# Patient Record
Sex: MALE | Race: WHITE | NOT HISPANIC OR LATINO
[De-identification: names, ages, dates, MRNs, and addresses within clinical notes are randomized per-mention and may not be internally consistent; named-entity substitution may affect disease eponyms.]

---

## 2018-05-17 ENCOUNTER — APPOINTMENT (OUTPATIENT)
Dept: PSYCHIATRY | Facility: CLINIC | Age: 63
End: 2018-05-17
Payer: COMMERCIAL

## 2018-05-17 DIAGNOSIS — Z87.39 PERSONAL HISTORY OF OTHER DISEASES OF THE MUSCULOSKELETAL SYSTEM AND CONNECTIVE TISSUE: ICD-10-CM

## 2018-05-17 PROBLEM — Z00.00 ENCOUNTER FOR PREVENTIVE HEALTH EXAMINATION: Status: ACTIVE | Noted: 2018-05-17

## 2018-05-17 PROCEDURE — 99205 OFFICE O/P NEW HI 60 MIN: CPT

## 2018-06-28 ENCOUNTER — APPOINTMENT (OUTPATIENT)
Dept: PSYCHIATRY | Facility: CLINIC | Age: 63
End: 2018-06-28

## 2018-08-21 ENCOUNTER — APPOINTMENT (OUTPATIENT)
Dept: PSYCHIATRY | Facility: CLINIC | Age: 63
End: 2018-08-21
Payer: COMMERCIAL

## 2018-08-21 PROCEDURE — 99214 OFFICE O/P EST MOD 30 MIN: CPT

## 2018-11-12 ENCOUNTER — APPOINTMENT (OUTPATIENT)
Dept: PSYCHIATRY | Facility: CLINIC | Age: 63
End: 2018-11-12
Payer: COMMERCIAL

## 2018-11-12 PROCEDURE — 99214 OFFICE O/P EST MOD 30 MIN: CPT

## 2018-11-12 RX ORDER — CLONAZEPAM 0.25 MG/1
0.25 TABLET, ORALLY DISINTEGRATING ORAL
Qty: 90 | Refills: 0 | Status: DISCONTINUED | COMMUNITY
Start: 2018-08-21 | End: 2018-11-12

## 2019-01-14 ENCOUNTER — APPOINTMENT (OUTPATIENT)
Dept: PSYCHIATRY | Facility: CLINIC | Age: 64
End: 2019-01-14

## 2019-02-20 ENCOUNTER — APPOINTMENT (OUTPATIENT)
Dept: PSYCHIATRY | Facility: CLINIC | Age: 64
End: 2019-02-20
Payer: COMMERCIAL

## 2019-02-20 PROCEDURE — 99214 OFFICE O/P EST MOD 30 MIN: CPT

## 2019-03-18 ENCOUNTER — APPOINTMENT (OUTPATIENT)
Dept: PSYCHIATRY | Facility: CLINIC | Age: 64
End: 2019-03-18

## 2019-05-09 ENCOUNTER — OUTPATIENT (OUTPATIENT)
Dept: OUTPATIENT SERVICES | Facility: HOSPITAL | Age: 64
LOS: 1 days | Discharge: STILL ADMITTED OR ADMINISTRATIVE | End: 2019-05-09

## 2019-05-15 ENCOUNTER — APPOINTMENT (OUTPATIENT)
Dept: PSYCHIATRY | Facility: CLINIC | Age: 64
End: 2019-05-15
Payer: COMMERCIAL

## 2019-05-15 PROCEDURE — 99214 OFFICE O/P EST MOD 30 MIN: CPT

## 2019-05-20 ENCOUNTER — APPOINTMENT (OUTPATIENT)
Dept: PSYCHIATRY | Facility: CLINIC | Age: 64
End: 2019-05-20
Payer: COMMERCIAL

## 2019-05-20 PROCEDURE — 99214 OFFICE O/P EST MOD 30 MIN: CPT

## 2019-06-12 ENCOUNTER — APPOINTMENT (OUTPATIENT)
Dept: PSYCHIATRY | Facility: CLINIC | Age: 64
End: 2019-06-12
Payer: COMMERCIAL

## 2019-06-12 PROCEDURE — 99214 OFFICE O/P EST MOD 30 MIN: CPT

## 2019-06-12 RX ORDER — TRAZODONE HYDROCHLORIDE 50 MG/1
50 TABLET ORAL
Qty: 60 | Refills: 0 | Status: DISCONTINUED | COMMUNITY
Start: 2019-05-15 | End: 2019-06-12

## 2019-08-29 ENCOUNTER — APPOINTMENT (OUTPATIENT)
Dept: PSYCHIATRY | Facility: CLINIC | Age: 64
End: 2019-08-29
Payer: COMMERCIAL

## 2019-08-29 PROCEDURE — 99214 OFFICE O/P EST MOD 30 MIN: CPT

## 2019-09-30 ENCOUNTER — APPOINTMENT (OUTPATIENT)
Dept: PSYCHIATRY | Facility: CLINIC | Age: 64
End: 2019-09-30

## 2019-10-04 ENCOUNTER — APPOINTMENT (OUTPATIENT)
Dept: PSYCHIATRY | Facility: CLINIC | Age: 64
End: 2019-10-04

## 2019-11-26 ENCOUNTER — APPOINTMENT (OUTPATIENT)
Dept: PSYCHIATRY | Facility: CLINIC | Age: 64
End: 2019-11-26
Payer: COMMERCIAL

## 2019-11-26 PROCEDURE — 99214 OFFICE O/P EST MOD 30 MIN: CPT

## 2020-02-18 ENCOUNTER — APPOINTMENT (OUTPATIENT)
Dept: PSYCHIATRY | Facility: CLINIC | Age: 65
End: 2020-02-18

## 2020-02-24 ENCOUNTER — APPOINTMENT (OUTPATIENT)
Dept: PULMONOLOGY | Facility: CLINIC | Age: 65
End: 2020-02-24

## 2020-03-04 ENCOUNTER — APPOINTMENT (OUTPATIENT)
Dept: PSYCHIATRY | Facility: CLINIC | Age: 65
End: 2020-03-04
Payer: COMMERCIAL

## 2020-03-04 PROCEDURE — 99214 OFFICE O/P EST MOD 30 MIN: CPT

## 2020-06-08 ENCOUNTER — APPOINTMENT (OUTPATIENT)
Dept: PSYCHIATRY | Facility: CLINIC | Age: 65
End: 2020-06-08
Payer: COMMERCIAL

## 2020-06-08 PROCEDURE — 99214 OFFICE O/P EST MOD 30 MIN: CPT

## 2020-06-08 RX ORDER — ESCITALOPRAM OXALATE 5 MG/1
5 TABLET ORAL DAILY
Qty: 30 | Refills: 2 | Status: DISCONTINUED | COMMUNITY
Start: 2019-06-12 | End: 2020-06-08

## 2020-06-08 RX ORDER — NEFAZODONE HYDROCHLORIDE 150 MG/1
150 TABLET ORAL TWICE DAILY
Qty: 60 | Refills: 1 | Status: DISCONTINUED | COMMUNITY
Start: 2018-05-17 | End: 2020-06-08

## 2020-09-14 ENCOUNTER — APPOINTMENT (OUTPATIENT)
Dept: PSYCHIATRY | Facility: CLINIC | Age: 65
End: 2020-09-14
Payer: COMMERCIAL

## 2020-09-14 PROCEDURE — 99214 OFFICE O/P EST MOD 30 MIN: CPT

## 2020-09-17 ENCOUNTER — APPOINTMENT (OUTPATIENT)
Dept: PULMONOLOGY | Facility: CLINIC | Age: 65
End: 2020-09-17

## 2020-09-18 ENCOUNTER — APPOINTMENT (OUTPATIENT)
Dept: PULMONOLOGY | Facility: CLINIC | Age: 65
End: 2020-09-18
Payer: COMMERCIAL

## 2020-09-18 VITALS
RESPIRATION RATE: 18 BRPM | OXYGEN SATURATION: 98 % | TEMPERATURE: 97.7 F | SYSTOLIC BLOOD PRESSURE: 128 MMHG | HEART RATE: 78 BPM | DIASTOLIC BLOOD PRESSURE: 80 MMHG

## 2020-09-18 DIAGNOSIS — R06.02 SHORTNESS OF BREATH: ICD-10-CM

## 2020-09-18 DIAGNOSIS — Z87.891 PERSONAL HISTORY OF NICOTINE DEPENDENCE: ICD-10-CM

## 2020-09-18 PROCEDURE — 36415 COLL VENOUS BLD VENIPUNCTURE: CPT

## 2020-09-18 PROCEDURE — 99204 OFFICE O/P NEW MOD 45 MIN: CPT | Mod: 25

## 2020-09-19 NOTE — DISCUSSION/SUMMARY
[FreeTextEntry1] : He is a 65 year old former smoker with a history of anxiety, sleep apnea and COPD. He smoked three packs per day for 47 years (141 pack/years). \par \par To continue with Symbicort and albuterol as needed for now.  \par \par A PFT will be obtained after a nasopharyngeal swab for COVID-19-PCR has been obtained and the result is negative. \par \par Blood work obtained. \par \par LDCT of the lungs advised. \par \par Will get PSG data. \par \par Further recommendations to follow the results of the above.

## 2020-09-19 NOTE — HISTORY OF PRESENT ILLNESS
[Former] : former [>= 30 pack years] : >= 30 pack years [Never] : never [TextBox_4] : He is a 65 year-old man. Presented with shortness of breath. The dyspnea is aggravated by warm conditions. Also by exertion. No chest pain, pressure or palpitations. The dyspnea is relieved by albuterol.\par \par He has a history of sleep apnea. He has been on CPAP for several years.\par \par He stopped smoking 2 years ago. He is retired from construction. [YearQuit] : 2018

## 2020-09-19 NOTE — PHYSICAL EXAM
[No Acute Distress] : no acute distress [IV] : Mallampati Class: IV [No Neck Mass] : no neck mass [Normal S1, S2] : normal s1, s2 [Clear to Auscultation Bilaterally] : clear to auscultation bilaterally [No HSM] : no hsm [No Clubbing] : no clubbing [No Edema] : no edema [No Focal Deficits] : no focal deficits [Oriented x3] : oriented x3

## 2020-09-19 NOTE — REVIEW OF SYSTEMS
[Wheezing] : wheezing [SOB on Exertion] : sob on exertion [Arthralgias] : arthralgias [Trauma/ Injury] : trauma/ injury [Anxiety] : anxiety [Fever] : no fever [Nasal Congestion] : no nasal congestion [Cough] : no cough [Chest Discomfort] : no chest discomfort [Edema] : no edema [Hay Fever] : no hay fever [GERD] : no gerd [Rash] : no rash [Anemia] : no anemia [Seizures] : no seizures [Diabetes] : no diabetes [Thyroid Problem] : no thyroid problem

## 2020-09-21 ENCOUNTER — APPOINTMENT (OUTPATIENT)
Dept: PSYCHIATRY | Facility: CLINIC | Age: 65
End: 2020-09-21
Payer: COMMERCIAL

## 2020-09-21 LAB
25(OH)D3 SERPL-MCNC: 20.7 NG/ML
ALBUMIN SERPL ELPH-MCNC: 4.3 G/DL
ALP BLD-CCNC: 66 U/L
ALT SERPL-CCNC: 26 U/L
ANION GAP SERPL CALC-SCNC: 12 MMOL/L
AST SERPL-CCNC: 16 U/L
BASOPHILS # BLD AUTO: 0.07 K/UL
BASOPHILS NFR BLD AUTO: 0.7 %
BILIRUB SERPL-MCNC: 0.3 MG/DL
BUN SERPL-MCNC: 9 MG/DL
CALCIUM SERPL-MCNC: 9.6 MG/DL
CHLORIDE SERPL-SCNC: 100 MMOL/L
CHOLEST SERPL-MCNC: 211 MG/DL
CHOLEST/HDLC SERPL: 4.8 RATIO
CO2 SERPL-SCNC: 25 MMOL/L
CREAT SERPL-MCNC: 0.83 MG/DL
EOSINOPHIL # BLD AUTO: 0.1 K/UL
EOSINOPHIL NFR BLD AUTO: 1 %
GLUCOSE SERPL-MCNC: 179 MG/DL
HCT VFR BLD CALC: 44 %
HDLC SERPL-MCNC: 44 MG/DL
HGB BLD-MCNC: 14.1 G/DL
IMM GRANULOCYTES NFR BLD AUTO: 0.7 %
LDLC SERPL CALC-MCNC: 131 MG/DL
LYMPHOCYTES # BLD AUTO: 2.33 K/UL
LYMPHOCYTES NFR BLD AUTO: 23.1 %
MAN DIFF?: NORMAL
MCHC RBC-ENTMCNC: 30 PG
MCHC RBC-ENTMCNC: 32 GM/DL
MCV RBC AUTO: 93.6 FL
MONOCYTES # BLD AUTO: 0.62 K/UL
MONOCYTES NFR BLD AUTO: 6.1 %
NEUTROPHILS # BLD AUTO: 6.91 K/UL
NEUTROPHILS NFR BLD AUTO: 68.4 %
PLATELET # BLD AUTO: 215 K/UL
POTASSIUM SERPL-SCNC: 4.2 MMOL/L
PROT SERPL-MCNC: 6.9 G/DL
RBC # BLD: 4.7 M/UL
RBC # FLD: 13.2 %
SODIUM SERPL-SCNC: 137 MMOL/L
TRIGL SERPL-MCNC: 178 MG/DL
TSH SERPL-ACNC: 1.33 UIU/ML
VIT B12 SERPL-MCNC: 563 PG/ML
WBC # FLD AUTO: 10.1 K/UL

## 2020-09-21 PROCEDURE — 99214 OFFICE O/P EST MOD 30 MIN: CPT

## 2020-12-18 ENCOUNTER — APPOINTMENT (OUTPATIENT)
Dept: PSYCHIATRY | Facility: CLINIC | Age: 65
End: 2020-12-18
Payer: COMMERCIAL

## 2020-12-18 PROCEDURE — 99072 ADDL SUPL MATRL&STAF TM PHE: CPT

## 2020-12-18 PROCEDURE — 99214 OFFICE O/P EST MOD 30 MIN: CPT

## 2020-12-18 RX ORDER — NEFAZODONE HYDROCHLORIDE 250 MG/1
250 TABLET ORAL
Qty: 180 | Refills: 1 | Status: DISCONTINUED | COMMUNITY
Start: 2020-03-27 | End: 2020-12-18

## 2021-01-06 ENCOUNTER — APPOINTMENT (OUTPATIENT)
Dept: PSYCHIATRY | Facility: CLINIC | Age: 66
End: 2021-01-06
Payer: COMMERCIAL

## 2021-01-06 PROCEDURE — 99214 OFFICE O/P EST MOD 30 MIN: CPT

## 2021-01-06 PROCEDURE — 99072 ADDL SUPL MATRL&STAF TM PHE: CPT

## 2021-01-06 RX ORDER — DULOXETINE HYDROCHLORIDE 30 MG/1
30 CAPSULE, DELAYED RELEASE PELLETS ORAL
Qty: 60 | Refills: 0 | Status: DISCONTINUED | COMMUNITY
Start: 2020-12-18 | End: 2021-01-06

## 2021-01-15 RX ORDER — BUPROPION HYDROCHLORIDE 150 MG/1
150 TABLET, EXTENDED RELEASE ORAL DAILY
Qty: 30 | Refills: 0 | Status: DISCONTINUED | COMMUNITY
Start: 2021-01-06 | End: 2021-01-15

## 2021-03-19 ENCOUNTER — APPOINTMENT (OUTPATIENT)
Dept: PSYCHIATRY | Facility: CLINIC | Age: 66
End: 2021-03-19
Payer: COMMERCIAL

## 2021-03-19 PROCEDURE — 99072 ADDL SUPL MATRL&STAF TM PHE: CPT

## 2021-03-19 PROCEDURE — 99214 OFFICE O/P EST MOD 30 MIN: CPT

## 2021-06-18 ENCOUNTER — APPOINTMENT (OUTPATIENT)
Dept: PSYCHIATRY | Facility: CLINIC | Age: 66
End: 2021-06-18
Payer: COMMERCIAL

## 2021-06-18 PROCEDURE — 99072 ADDL SUPL MATRL&STAF TM PHE: CPT

## 2021-06-18 PROCEDURE — 99214 OFFICE O/P EST MOD 30 MIN: CPT

## 2021-06-18 RX ORDER — ESCITALOPRAM OXALATE 5 MG/1
5 TABLET ORAL DAILY
Qty: 30 | Refills: 0 | Status: DISCONTINUED | COMMUNITY
Start: 2021-03-19 | End: 2021-06-18

## 2021-09-14 ENCOUNTER — APPOINTMENT (OUTPATIENT)
Dept: PSYCHIATRY | Facility: CLINIC | Age: 66
End: 2021-09-14
Payer: COMMERCIAL

## 2021-09-14 PROCEDURE — 99214 OFFICE O/P EST MOD 30 MIN: CPT

## 2021-09-23 ENCOUNTER — APPOINTMENT (OUTPATIENT)
Dept: GASTROENTEROLOGY | Facility: CLINIC | Age: 66
End: 2021-09-23

## 2021-11-30 ENCOUNTER — NON-APPOINTMENT (OUTPATIENT)
Age: 66
End: 2021-11-30

## 2021-12-01 DIAGNOSIS — N50.819 TESTICULAR PAIN, UNSPECIFIED: ICD-10-CM

## 2021-12-01 DIAGNOSIS — R39.89 OTHER SYMPTOMS AND SIGNS INVOLVING THE GENITOURINARY SYSTEM: ICD-10-CM

## 2021-12-02 ENCOUNTER — APPOINTMENT (OUTPATIENT)
Dept: PSYCHIATRY | Facility: CLINIC | Age: 66
End: 2021-12-02

## 2021-12-16 ENCOUNTER — APPOINTMENT (OUTPATIENT)
Dept: PSYCHIATRY | Facility: CLINIC | Age: 66
End: 2021-12-16
Payer: COMMERCIAL

## 2021-12-16 PROCEDURE — 99214 OFFICE O/P EST MOD 30 MIN: CPT

## 2021-12-27 ENCOUNTER — APPOINTMENT (OUTPATIENT)
Dept: UROLOGY | Facility: CLINIC | Age: 66
End: 2021-12-27

## 2022-03-22 ENCOUNTER — APPOINTMENT (OUTPATIENT)
Dept: PSYCHIATRY | Facility: CLINIC | Age: 67
End: 2022-03-22
Payer: COMMERCIAL

## 2022-03-22 PROCEDURE — 99214 OFFICE O/P EST MOD 30 MIN: CPT

## 2022-06-14 ENCOUNTER — APPOINTMENT (OUTPATIENT)
Dept: PSYCHIATRY | Facility: CLINIC | Age: 67
End: 2022-06-14
Payer: COMMERCIAL

## 2022-06-14 PROCEDURE — 99214 OFFICE O/P EST MOD 30 MIN: CPT

## 2022-09-14 ENCOUNTER — APPOINTMENT (OUTPATIENT)
Dept: PSYCHIATRY | Facility: CLINIC | Age: 67
End: 2022-09-14

## 2022-09-14 DIAGNOSIS — F41.9 ANXIETY DISORDER, UNSPECIFIED: ICD-10-CM

## 2022-09-14 DIAGNOSIS — F32.A DEPRESSION, UNSPECIFIED: ICD-10-CM

## 2022-09-14 DIAGNOSIS — F41.0 PANIC DISORDER [EPISODIC PAROXYSMAL ANXIETY]: ICD-10-CM

## 2022-09-14 PROCEDURE — 99214 OFFICE O/P EST MOD 30 MIN: CPT

## 2022-12-07 ENCOUNTER — APPOINTMENT (OUTPATIENT)
Dept: PSYCHIATRY | Facility: CLINIC | Age: 67
End: 2022-12-07

## 2023-01-11 ENCOUNTER — APPOINTMENT (OUTPATIENT)
Dept: PSYCHIATRY | Facility: CLINIC | Age: 68
End: 2023-01-11
Payer: MEDICARE

## 2023-01-11 PROCEDURE — 99215 OFFICE O/P EST HI 40 MIN: CPT

## 2023-01-11 NOTE — SOCIAL HISTORY
[FreeTextEntry1] : Patient grew up in a very chaotic situation. Family was very poor he looked with his grandparents until he was 6 years of age. Patient then lived in [place]. He went to [employer] school but dropped out in his senior year. Patient states he was told he was an undergraduate that he only repeated the second grade level. Patient played basketball in high school. 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 Patient was disabled in 2005. Patient is  and 1984 for 13 years.

## 2023-01-11 NOTE — FAMILY HISTORY
[FreeTextEntry1] : Patient born in [facility]. Both parents . Mother had a psychiatric history he doesn't know the exact diagnosis. Father was an alcoholic. There is a history of alcoholism on father's side of the family.

## 2023-01-11 NOTE — PAST MEDICAL HISTORY
[FreeTextEntry1] : Patient was first treated in 2000. He states is on multiple psychiatric medications to include SSRIs. Patient has been on Serzone and clonazepam for about 15 years. Patient used cocaine from 1979 until 1981 he drank until 1997

## 2023-01-11 NOTE — DISCUSSION/SUMMARY
[FreeTextEntry1] : Assessment: Patient is a 68 yo male with h/o depression and anxiety seen today for medication management. Patient is compliant with the medications, tolerating it well without any side effects. I-STOP was checked without any problems\par \par \par Plan: \par Continue Klonopin 0.5 mg TID ( Tries to take 1.25 mg  most days) for anxiety\par Increase Lexapro 10 to 15 mg PO QD for depression and anxiety\par - Discussed risks and benefits of medications including side effects of GI and sexual with SSRI. Alternative strategies including no intervention discussed with patient. Patient consents to current medications as prescribed.\par - Patient understands to contact clinic prn with concerns and agrees to call 911 or go to nearest ER if symptoms worsen.\par - Next appointment made in 1 month. Patient left the office without any distress.\par \par

## 2023-01-11 NOTE — REASON FOR VISIT
[Follow-Up Visit] : a follow-up visit [Spouse] : spouse [Family Member] : family member [FreeTextEntry1] : depression anxiety

## 2023-01-11 NOTE — HISTORY OF PRESENT ILLNESS
[No] : no [de-identified] : Patient is accompanied by his fiance. \par \par Patient is a transfer from [facility] as she left the practice. Patient is currently on Lexapro 10 mg and Klonopin 0.5 mg PO TID PRN\par \par States by accident for the past month he was taking Lexapro 20 mg instead of 10 mg. After he realized he was taking 10 in the morning and 10 at bed time he stopped and only took 10 mg. He has been on 10 mg for 2 weeks. States he is feeling jittery. States he came to see Callie initially for anxiety and depression. States he is going to be a grandfather. \par \par Mood: irritable, anxious, no depression. \par Sleep: decreased. 4 hours broken States he has a CPAP but states he is not getting good sleep on it. It stopped working. \par Appetite: increased. Binge eats at night on junk (cookies ice creams and chips). \par Energy: decreased\par Concentration: decreased\par Motivation: decreased\par Denies any AVH, SI or HI.\par Takes the Klonopin 0.25 in the morning and afternoon and 0.5 mg at bed time. \par \par \par \par

## 2023-01-11 NOTE — CURRENT PSYCHIATRIC SYMPTOMS
[Depressed Mood] : no depressed mood [Insomnia] : no insomnia disorder [Excessive Worry] : no excessive worries [Restlessness] : no restlessness [de-identified] : denied [de-identified] : denied [de-identified] : denied [de-identified] : denied [de-identified] : none [de-identified] : none [de-identified] : none

## 2023-01-11 NOTE — PHYSICAL EXAM
[None] : none [Normal] : normal [Fair] : fair [FreeTextEntry2] : Walking with a cane [Anxious] : no anxious [Dysphoric] : not dysphoric [FreeTextEntry1] : overweight [FreeTextEntry8] : good [FreeTextEntry9] : bright

## 2023-02-06 ENCOUNTER — APPOINTMENT (OUTPATIENT)
Dept: PSYCHIATRY | Facility: CLINIC | Age: 68
End: 2023-02-06

## 2023-03-07 ENCOUNTER — APPOINTMENT (OUTPATIENT)
Dept: PSYCHIATRY | Facility: CLINIC | Age: 68
End: 2023-03-07

## 2023-03-22 ENCOUNTER — APPOINTMENT (OUTPATIENT)
Dept: PSYCHIATRY | Facility: CLINIC | Age: 68
End: 2023-03-22
Payer: MEDICARE

## 2023-03-22 PROCEDURE — 99214 OFFICE O/P EST MOD 30 MIN: CPT

## 2023-03-22 RX ORDER — ESCITALOPRAM OXALATE 5 MG/1
5 TABLET ORAL DAILY
Qty: 30 | Refills: 0 | Status: COMPLETED | COMMUNITY
Start: 2023-01-11 | End: 2023-03-22

## 2023-03-22 NOTE — HISTORY OF PRESENT ILLNESS
[No] : no [de-identified] : Patient is accompanied by his fiance. \par \par Last month Lexapro was increased from 10 to 15 mg. States on 15 mg at bed time he was getting nightmares every night.. So he went down to 10 mg. Now he gets them and less. "Chinese people are chasing me." Denies waking up from the nightmares/vivid dreams. \par \par In regards to Klonopin states he has 7 pills left. States he takes 0.5 mg at bed time with the Lexapro 10 mg and he takes 0.25 mid afternoon at 2 pm. States at around 2pm he is feeling foggy, legs feel like rubber. He takes a Half a Klonopin (0.25) and he feels better. \par Mood: less depressed, anxious, and irritability.  \par Sleep: decreased. 5 hours broken States he has a CPAP but states he is not getting good sleep on it. It stopped working. \par Appetite: increased. Binge eats at night on junk (cookies ice creams and chips). \par Energy: decreased\par Concentration: decreased\par Motivation: decreased\par Denies any AVH, SI or HI.\par Takes the Klonopin 0.25 in the at 2pm and afternoon and 0.5 mg at bed time. \par \par \par \par

## 2023-03-22 NOTE — FAMILY HISTORY
[FreeTextEntry1] : Patient born in [facility]. Both parents . Mother had a psychiatric history he doesn't know the exact diagnosis. Father was an alcoholic. There is a history of alcoholism on father's side of the family.

## 2023-03-22 NOTE — SOCIAL HISTORY
[FreeTextEntry1] : Patient grew up in a very chaotic situation. Family was very poor he looked with his grandparents until he was 6 years of age. Patient then lived in [place]. He went to [employer] school but dropped out in his senior year. Patient states he was told he was an undergraduate that he only repeated the second grade level. Patient played basketball in high school. 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 Patient was disabled in 2005. Patient is  and 1984 for 13 years.

## 2023-03-22 NOTE — DISCUSSION/SUMMARY
[FreeTextEntry1] : Assessment: Patient is a 66 yo male with h/o depression and anxiety seen today for medication management. Patient is compliant with the medications, tolerating it well without any side effects. I-STOP was checked without any problems\par \par \par Plan: \par Continue Klonopin 0.5 mg BID ( Takes between 0.75 to 1 mg)\par Decrease Lexapro 15 to 10 mg PO QD for depression and anxiety\par - Discussed risks and benefits of medications including side effects of GI and sexual with SSRI. Alternative strategies including no intervention discussed with patient. Patient consents to current medications as prescribed.\par - Patient understands to contact clinic prn with concerns and agrees to call 911 or go to nearest ER if symptoms worsen.\par - Next appointment made in 3 month. Patient left the office without any distress.\par \par

## 2023-05-15 ENCOUNTER — APPOINTMENT (OUTPATIENT)
Dept: PODIATRY | Facility: CLINIC | Age: 68
End: 2023-05-15

## 2023-07-19 ENCOUNTER — APPOINTMENT (OUTPATIENT)
Dept: PSYCHIATRY | Facility: CLINIC | Age: 68
End: 2023-07-19
Payer: MEDICARE

## 2023-07-19 PROCEDURE — 99214 OFFICE O/P EST MOD 30 MIN: CPT

## 2023-07-19 NOTE — SOCIAL HISTORY
[FreeTextEntry1] : Patient grew up in a very chaotic situation. Family was very poor he looked with his grandparents until he was 6 years of age. Patient then lived in [place]. He went to [employer] school but dropped out in his senior year. Patient states he was told he was an undergraduate that he only repeated the second grade level. Patient played basketball in high school. 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 Patient was disabled in 2005. Patient is  and 1984 for 13 years.

## 2023-07-19 NOTE — HISTORY OF PRESENT ILLNESS
[No] : no [de-identified] : \par Patient is here in the office for face to face interview with writer for 3 month follow-up visit.\par \par \par Patient is accompanied by his fiance. \par \par Last month Lexapro was decreased from 15 to 10 mg as he was getting nightmares on the 15 mg. So he went down to 10 mg. Now he gets them and less. \par \par In regards to Klonopin states he has 6 pills left. States he takes 0.5 mg at bed time with the Lexapro 10 mg. \par Mood: less depressed, anxious, and irritability. Wife says he is doing better. He gets out more, less nervous.   \par Sleep: decreased. 5 hours broken Waking up at 3 am. States he has a CPAP but states he is not getting good sleep on it. It stopped working. \par Appetite: increased but states his weight is decreased. Went from 316 to 295 lbs. Binge eats at night on junk (cookies ice creams and chips). \par Energy: better\par Concentration: better\par Motivation: better\par Denies any AVH, SI or HI.\par He takes the Klonopin 0.5 mg PO QD. \par \par \par

## 2023-07-19 NOTE — FAMILY HISTORY
[FreeTextEntry1] : Patient born in [facility]. Both parents . Mother had a psychiatric history he doesn't know the exact diagnosis. Father was an alcoholic. There is a history of alcoholism on father's side of the family.

## 2023-07-19 NOTE — DISCUSSION/SUMMARY
[FreeTextEntry1] : Assessment: Patient is a 68 yo male with h/o depression and anxiety seen today for medication management. Patient is compliant with the medications, tolerating it well without any side effects. I-STOP was checked without any problems\par \par \par Plan: \par Continue Klonopin 0.5 mg QD\par Continue Lexapro 10 mg PO QD for depression and anxiety\par - Discussed risks and benefits of medications including side effects of GI and sexual with SSRI. Alternative strategies including no intervention discussed with patient. Patient consents to current medications as prescribed.\par - Patient understands to contact clinic prn with concerns and agrees to call 911 or go to nearest ER if symptoms worsen.\par - Next appointment made in 3 month. Patient left the office without any distress.\par \par

## 2023-10-18 ENCOUNTER — APPOINTMENT (OUTPATIENT)
Dept: PSYCHIATRY | Facility: CLINIC | Age: 68
End: 2023-10-18
Payer: MEDICARE

## 2023-10-18 PROCEDURE — 99214 OFFICE O/P EST MOD 30 MIN: CPT

## 2024-01-23 ENCOUNTER — APPOINTMENT (OUTPATIENT)
Dept: PSYCHIATRY | Facility: CLINIC | Age: 69
End: 2024-01-23
Payer: MEDICARE

## 2024-01-23 PROCEDURE — 99214 OFFICE O/P EST MOD 30 MIN: CPT

## 2024-01-23 NOTE — HISTORY OF PRESENT ILLNESS
[de-identified] : Patient is here in the office for face to face interview with writer for 3 month follow-up visit.   Patient is accompanied by his fiance.   No medication changes on last appt. States he ran out of the Lexapro 3 days ago and Klonopin. +Jittery.   Mood: stable. Less depressed, anxious, and irritability. Wife says he is doing better.     Sleep: taking less naps than before. 6 hours broken Waking up at 3 am and go back to sleep at 4:30 or 5 am. Watches TV. Goes back to sleep and wakes up at 8am. . States he has a CPAP and wears it.   Appetite: Went from 280 to 285 lbs. On weight losing injection Monjoro. Binge eats at night on junk is less (cookies ice creams and chips).  Energy: decreased Concentration: better Motivation: better Denies any AVH, SI or HI. He takes the Klonopin 0.5 mg PO QD.     [No] : no

## 2024-01-23 NOTE — SOCIAL HISTORY
[FreeTextEntry1] : Patient grew up in a very chaotic situation. Family was very poor he looked with his grandparents until he was 6 years of age. Patient then lived in [place]. He went to [employer] school but dropped out in his senior year. Patient states he was told he was an undergraduate that he only repeated the second grade level. Patient played basketball in high school. 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 Patient was disabled in 2005. Patient is  and 1984 for 13 years.

## 2024-01-23 NOTE — DISCUSSION/SUMMARY
[FreeTextEntry1] : Assessment: Patient is a 68 yo male with h/o depression and anxiety seen today for medication management. Patient is compliant with the medications, tolerating it well without any side effects. I-STOP was checked without any problems   Plan:  Continue Klonopin 0.5 mg QD. Gave it TID to save patient money.  Increase Lexapro 10 to 15 mg PO QD for depression and anxiety - Discussed risks and benefits of medications including side effects of GI and sexual with SSRI. Alternative strategies including no intervention discussed with patient. Patient consents to current medications as prescribed. - Patient understands to contact clinic prn with concerns and agrees to call 911 or go to nearest ER if symptoms worsen. - Next appointment made in 1 month. Patient left the office without any distress.

## 2024-01-23 NOTE — PHYSICAL EXAM
[None] : none [FreeTextEntry2] : Walking with a cane [Anxious] : no anxious [Dysphoric] : not dysphoric [Normal] : normal [Fair] : fair [FreeTextEntry1] : overweight [FreeTextEntry8] : good [FreeTextEntry9] : bright

## 2024-01-23 NOTE — FAMILY HISTORY
[FreeTextEntry1] : Patient born in [facility]. Both parents . Mother had a psychiatric history he doesn't know the exact diagnosis. Father was an alcoholic. There is a history of alcoholism on father's side of the family.

## 2024-02-06 ENCOUNTER — APPOINTMENT (OUTPATIENT)
Dept: ENDOCRINOLOGY | Facility: CLINIC | Age: 69
End: 2024-02-06
Payer: MEDICARE

## 2024-02-06 VITALS
HEIGHT: 75 IN | BODY MASS INDEX: 34.82 KG/M2 | WEIGHT: 280 LBS | SYSTOLIC BLOOD PRESSURE: 122 MMHG | DIASTOLIC BLOOD PRESSURE: 80 MMHG | RESPIRATION RATE: 12 BRPM | OXYGEN SATURATION: 98 % | HEART RATE: 104 BPM

## 2024-02-06 DIAGNOSIS — R79.89 OTHER SPECIFIED ABNORMAL FINDINGS OF BLOOD CHEMISTRY: ICD-10-CM

## 2024-02-06 PROCEDURE — 36415 COLL VENOUS BLD VENIPUNCTURE: CPT

## 2024-02-06 PROCEDURE — 99204 OFFICE O/P NEW MOD 45 MIN: CPT

## 2024-02-06 NOTE — ADDENDUM
[FreeTextEntry1] :  By signing my name below, I, Jeaneth Donnelly, attest that this document has been prepared under the direction and in the presence of Dr. Verduzco.  I, Minor Verduzco MD, personally performed the services described in this documentation. All medical record entries made by the scribe were at my discretion and in my presence. I have reviewed the chart and discharge instructions (if applicable) and agree that the record reflects my personal permanence and is accurate and complete.

## 2024-02-06 NOTE — PHYSICAL EXAM
[Alert] : alert [Well Nourished] : well nourished [Healthy Appearance] : healthy appearance [Obese] : obese [No Acute Distress] : no acute distress [Well Developed] : well developed [Normal Voice/Communication] : normal voice communication [Normal Sclera/Conjunctiva] : normal sclera/conjunctiva [No Proptosis] : no proptosis [No Neck Mass] : no neck mass was observed [No LAD] : no lymphadenopathy [Supple] : the neck was supple [Thyroid Not Enlarged] : the thyroid was not enlarged [No Thyroid Nodules] : no palpable thyroid nodules [No Respiratory Distress] : no respiratory distress [Oriented x3] : oriented to person, place, and time [Normal Affect] : the affect was normal [Normal Insight/Judgement] : insight and judgment were intact [Normal Mood] : the mood was normal

## 2024-02-06 NOTE — HISTORY OF PRESENT ILLNESS
[FreeTextEntry1] : CC: diabetes   This is a 69-year-old male with T2DM, obesity, vitamin D insufficiency, HLD, anxiety, skin cancer, here for consultation.   He was diagnosed with diabetes at age 68.  He is currently on Mounjaro 5 mg weekly and Jardiance 10 mg daily. He started Jardiance 3 weeks ago. He is tolerating Jardiance well. He reports diarrhea 3 days of the week after taking Mounjaro. He states he cannot leave the house those 3 days due to diarrhea.  Does not SMBG.  He also took 3 other tablets for diabetes and developed reaction to all three. He was on metformin but developed diarrhea. He was on Wevogy and Ozempic but stopped it due to abdominal pain and diarrhea.  Last ophthalmology visit was October 2023. Denies retinopathy. Has cataract and requires cataract surgery. He has proceeded with surgery as hemoglobin A1c is elevated.  No know nephropathy. Denies neuropathy.  He is not on any statin as he has developed statin induced myalgias. He is on ezetimibe 10 mg daily which was started three weeks ago.  He is not on an ACE inhibitor or ARB.

## 2024-02-06 NOTE — ASSESSMENT
[FreeTextEntry1] : This is a 69-year-old male with T2DM, obesity, vitamin D insufficiency, HLD, anxiety, skin cancer, here for consultation.    1. T2DM  Check hemoglobin A1c and CMP.  He is currently on Mounjaro 5 mg weekly and Jardiance 10 mg daily. He started Jardiance 3 weeks ago. He is tolerating Jardiance well. He reports diarrhea 3 days of the week after taking Mounjaro. He states he cannot leave the house those 3 days due to diarrhea.  Will likely discontinue Mounjaro due to diarrhea.  May need to increase Jardiance to 25 mg daily.  Will contact pharmacy to obtain list of prior of medication that he has tried and developed a reaction to.  For obesity, LSM.  Last ophthalmology visit was October 2023. Denies retinopathy. Has cataract and requires cataract surgery. He has not proceeded with surgery as hemoglobin A1c is elevated. Referred to Dr. Moreira.  No known nephropathy. Denies neuropathy. Check urine microalbumin.  Appointment with CDE.  For vitamin D insufficiency, check 25 vitamin D.  He is not on a statin as he has developed statin induced myalgias. He is on ezetimibe 10 mg daily which was started three weeks ago. Check lipid panel  He is not on an ACE inhibitor or ARB.

## 2024-03-05 ENCOUNTER — APPOINTMENT (OUTPATIENT)
Dept: PSYCHIATRY | Facility: CLINIC | Age: 69
End: 2024-03-05
Payer: MEDICARE

## 2024-03-05 PROCEDURE — 99214 OFFICE O/P EST MOD 30 MIN: CPT

## 2024-03-05 RX ORDER — ESCITALOPRAM OXALATE 5 MG/1
5 TABLET ORAL DAILY
Qty: 90 | Refills: 0 | Status: COMPLETED | COMMUNITY
Start: 2024-01-23 | End: 2024-03-05

## 2024-03-05 NOTE — FAMILY HISTORY
[FreeTextEntry1] : Patient born in [facility]. Both parents . Mother had a psychiatric history he doesn't know the exact diagnosis. Father was an alcoholic. There is a history of alcoholism on father's side of the family.

## 2024-03-05 NOTE — HISTORY OF PRESENT ILLNESS
[de-identified] : Patient is here in the office for face to face interview with writer for 3 month follow-up visit.  Patient is accompanied by his fiance.   Last month Lexapro was increased from 10 to 15 mg. States he liked it but then he ran out. Wants to increase it to 20 mg.   Mood: stable. Depressed. States he goes to Facebook and gets depressed because they are talking about childhood. Patient didn't have a good childhood and that makes him depressed.  Sleep: taking less naps than before. 6 hours broken Waking up at 3 am and go back to sleep at 4:30 or 5 am. Watches TV. Goes back to sleep and wakes up at 8am. . States he has a CPAP and wears it.   Appetite: Went from 280 to 285 lbs. On weight losing injection Monjoro. Binge eats at night on junk is less (cookies ice creams and chips).  Energy: decreased Concentration: better Motivation: better Denies any AVH, SI or HI. He takes the Klonopin 0.5 mg PO QD.     [No] : no

## 2024-03-05 NOTE — REASON FOR VISIT
[Spouse] : spouse [Follow-Up Visit] : a follow-up visit [Family Member] : family member [FreeTextEntry1] : depression anxiety

## 2024-03-05 NOTE — DISCUSSION/SUMMARY
[FreeTextEntry1] : Assessment: Patient is a 68 yo male with h/o depression and anxiety seen today for medication management. Patient is compliant with the medications, tolerating it well without any side effects. I-STOP was checked without any problems   Plan:  Continue Klonopin 0.5 mg QD. Gave it TID to save patient money.  Increase Lexapro 15 to 20 mg PO QD for depression and anxiety - Discussed risks and benefits of medications including side effects of GI and sexual with SSRI. Alternative strategies including no intervention discussed with patient. Patient consents to current medications as prescribed. - Patient understands to contact clinic prn with concerns and agrees to call 911 or go to nearest ER if symptoms worsen. - Next appointment made in 3 month. Patient left the office without any distress.

## 2024-03-05 NOTE — SOCIAL HISTORY
[FreeTextEntry1] : Patient grew up in a very chaotic situation. Family was very poor he looked with his grandparents until he was 6 years of age. Patient then lived in [place]. He went to [employer] school but dropped out in his senior year. Patient states he was told he was an undergraduate that he only repeated the second grade level. Patient played basketball in high school. 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 Patient was disabled in 2005. Patient is  and 1984 for 13 years.

## 2024-03-06 ENCOUNTER — APPOINTMENT (OUTPATIENT)
Dept: ENDOCRINOLOGY | Facility: CLINIC | Age: 69
End: 2024-03-06
Payer: MEDICARE

## 2024-03-06 ENCOUNTER — APPOINTMENT (OUTPATIENT)
Dept: INTERNAL MEDICINE | Facility: CLINIC | Age: 69
End: 2024-03-06

## 2024-03-06 PROCEDURE — G0108 DIAB MANAGE TRN  PER INDIV: CPT

## 2024-03-06 PROCEDURE — 36415 COLL VENOUS BLD VENIPUNCTURE: CPT

## 2024-03-14 LAB
ALBUMIN SERPL ELPH-MCNC: 4.4 G/DL
ALP BLD-CCNC: 83 U/L
ALT SERPL-CCNC: 20 U/L
ANION GAP SERPL CALC-SCNC: 14 MMOL/L
AST SERPL-CCNC: 13 U/L
BASOPHILS # BLD AUTO: 0.07 K/UL
BASOPHILS NFR BLD AUTO: 0.6 %
BILIRUB SERPL-MCNC: 0.4 MG/DL
BUN SERPL-MCNC: 12 MG/DL
CALCIUM SERPL-MCNC: 10 MG/DL
CHLORIDE SERPL-SCNC: 97 MMOL/L
CHOLEST SERPL-MCNC: 229 MG/DL
CO2 SERPL-SCNC: 24 MMOL/L
CREAT SERPL-MCNC: 0.87 MG/DL
EGFR: 95 ML/MIN/1.73M2
EOSINOPHIL # BLD AUTO: 0.14 K/UL
EOSINOPHIL NFR BLD AUTO: 1.2 %
ESTIMATED AVERAGE GLUCOSE: 237 MG/DL
GLUCOSE SERPL-MCNC: 384 MG/DL
HBA1C MFR BLD HPLC: 9.9 %
HCT VFR BLD CALC: 48.8 %
HDLC SERPL-MCNC: 40 MG/DL
HGB BLD-MCNC: 16.3 G/DL
IMM GRANULOCYTES NFR BLD AUTO: 0.5 %
LDLC SERPL CALC-MCNC: 132 MG/DL
LYMPHOCYTES # BLD AUTO: 3.33 K/UL
LYMPHOCYTES NFR BLD AUTO: 29.5 %
MAN DIFF?: NORMAL
MCHC RBC-ENTMCNC: 29.9 PG
MCHC RBC-ENTMCNC: 33.4 GM/DL
MCV RBC AUTO: 89.5 FL
MONOCYTES # BLD AUTO: 0.81 K/UL
MONOCYTES NFR BLD AUTO: 7.2 %
NEUTROPHILS # BLD AUTO: 6.89 K/UL
NEUTROPHILS NFR BLD AUTO: 61 %
NONHDLC SERPL-MCNC: 190 MG/DL
PLATELET # BLD AUTO: 256 K/UL
POTASSIUM SERPL-SCNC: 4.5 MMOL/L
PROT SERPL-MCNC: 7.2 G/DL
RBC # BLD: 5.45 M/UL
RBC # FLD: 13.8 %
SODIUM SERPL-SCNC: 135 MMOL/L
TRIGL SERPL-MCNC: 321 MG/DL
WBC # FLD AUTO: 11.3 K/UL

## 2024-03-17 RX ORDER — EMPAGLIFLOZIN 25 MG/1
25 TABLET, FILM COATED ORAL
Qty: 90 | Refills: 2 | Status: ACTIVE | COMMUNITY
Start: 2024-03-14 | End: 2024-12-09

## 2024-03-21 ENCOUNTER — APPOINTMENT (OUTPATIENT)
Dept: OPHTHALMOLOGY | Facility: CLINIC | Age: 69
End: 2024-03-21

## 2024-04-22 ENCOUNTER — APPOINTMENT (OUTPATIENT)
Dept: OPHTHALMOLOGY | Facility: CLINIC | Age: 69
End: 2024-04-22
Payer: MEDICARE

## 2024-04-22 ENCOUNTER — NON-APPOINTMENT (OUTPATIENT)
Age: 69
End: 2024-04-22

## 2024-04-22 PROCEDURE — 92004 COMPRE OPH EXAM NEW PT 1/>: CPT

## 2024-05-20 ENCOUNTER — NON-APPOINTMENT (OUTPATIENT)
Age: 69
End: 2024-05-20

## 2024-05-20 ENCOUNTER — APPOINTMENT (OUTPATIENT)
Dept: OPHTHALMOLOGY | Facility: CLINIC | Age: 69
End: 2024-05-20
Payer: MEDICARE

## 2024-05-20 PROCEDURE — 92012 INTRM OPH EXAM EST PATIENT: CPT

## 2024-05-20 PROCEDURE — 92136 OPHTHALMIC BIOMETRY: CPT

## 2024-06-04 ENCOUNTER — NON-APPOINTMENT (OUTPATIENT)
Age: 69
End: 2024-06-04

## 2024-06-04 ENCOUNTER — APPOINTMENT (OUTPATIENT)
Dept: OPHTHALMOLOGY | Facility: CLINIC | Age: 69
End: 2024-06-04
Payer: MEDICARE

## 2024-06-04 PROCEDURE — 92012 INTRM OPH EXAM EST PATIENT: CPT

## 2024-06-21 ENCOUNTER — APPOINTMENT (OUTPATIENT)
Dept: OPHTHALMOLOGY | Facility: EYE CENTER | Age: 69
End: 2024-06-21

## 2024-06-22 ENCOUNTER — APPOINTMENT (OUTPATIENT)
Dept: OPHTHALMOLOGY | Facility: CLINIC | Age: 69
End: 2024-06-22

## 2024-06-24 ENCOUNTER — APPOINTMENT (OUTPATIENT)
Dept: ENDOCRINOLOGY | Facility: CLINIC | Age: 69
End: 2024-06-24
Payer: MEDICARE

## 2024-06-24 VITALS
BODY MASS INDEX: 34.82 KG/M2 | SYSTOLIC BLOOD PRESSURE: 130 MMHG | WEIGHT: 280 LBS | DIASTOLIC BLOOD PRESSURE: 70 MMHG | HEART RATE: 91 BPM | OXYGEN SATURATION: 97 % | HEIGHT: 75 IN

## 2024-06-24 DIAGNOSIS — E11.65 TYPE 2 DIABETES MELLITUS WITH HYPERGLYCEMIA: ICD-10-CM

## 2024-06-24 DIAGNOSIS — E66.9 OBESITY, UNSPECIFIED: ICD-10-CM

## 2024-06-24 DIAGNOSIS — E78.5 HYPERLIPIDEMIA, UNSPECIFIED: ICD-10-CM

## 2024-06-24 LAB
GLUCOSE BLDC GLUCOMTR-MCNC: 397
HBA1C MFR BLD HPLC: 10.9

## 2024-06-24 PROCEDURE — 82962 GLUCOSE BLOOD TEST: CPT

## 2024-06-24 PROCEDURE — 99205 OFFICE O/P NEW HI 60 MIN: CPT

## 2024-06-24 PROCEDURE — G2211 COMPLEX E/M VISIT ADD ON: CPT

## 2024-06-24 PROCEDURE — 83036 HEMOGLOBIN GLYCOSYLATED A1C: CPT | Mod: QW

## 2024-06-24 RX ORDER — CHOLECALCIFEROL (VITAMIN D3) 10(400)/ML
DROPS ORAL
Qty: 3 | Refills: 3 | Status: ACTIVE | COMMUNITY
Start: 2024-06-24 | End: 1900-01-01

## 2024-06-24 RX ORDER — CLONAZEPAM 0.5 MG/1
0.5 TABLET ORAL 3 TIMES DAILY
Qty: 90 | Refills: 0 | Status: ACTIVE | COMMUNITY
Start: 2018-05-17 | End: 1900-01-01

## 2024-06-24 RX ORDER — LINAGLIPTIN 5 MG/1
5 TABLET, FILM COATED ORAL
Qty: 90 | Refills: 3 | Status: ACTIVE | COMMUNITY
Start: 2024-06-24 | End: 1900-01-01

## 2024-06-24 RX ORDER — ALCOHOL ANTISEPTIC PADS
PADS, MEDICATED (EA) TOPICAL 3 TIMES DAILY
Qty: 6 | Refills: 3 | Status: ACTIVE | COMMUNITY
Start: 2024-06-24 | End: 1900-01-01

## 2024-06-24 RX ORDER — ESCITALOPRAM OXALATE 20 MG/1
20 TABLET ORAL DAILY
Qty: 90 | Refills: 0 | Status: ACTIVE | COMMUNITY
Start: 2021-01-15 | End: 1900-01-01

## 2024-06-24 RX ORDER — PEN NEEDLE, DIABETIC 29 G X1/2"
32G X 4 MM NEEDLE, DISPOSABLE MISCELLANEOUS
Qty: 90 | Refills: 1 | Status: ACTIVE | COMMUNITY
Start: 2024-06-24 | End: 1900-01-01

## 2024-06-24 RX ORDER — INSULIN DEGLUDEC INJECTION 100 U/ML
100 INJECTION, SOLUTION SUBCUTANEOUS
Qty: 45 | Refills: 1 | Status: ACTIVE | COMMUNITY
Start: 2024-06-24 | End: 1900-01-01

## 2024-06-24 RX ORDER — LANCING DEVICE
EACH MISCELLANEOUS
Qty: 1 | Refills: 0 | Status: ACTIVE | COMMUNITY
Start: 2024-06-24 | End: 1900-01-01

## 2024-06-27 ENCOUNTER — NON-APPOINTMENT (OUTPATIENT)
Age: 69
End: 2024-06-27

## 2024-07-02 ENCOUNTER — APPOINTMENT (OUTPATIENT)
Dept: PSYCHIATRY | Facility: CLINIC | Age: 69
End: 2024-07-02
Payer: MEDICARE

## 2024-07-02 PROCEDURE — 99214 OFFICE O/P EST MOD 30 MIN: CPT

## 2024-07-03 ENCOUNTER — APPOINTMENT (OUTPATIENT)
Dept: ENDOCRINOLOGY | Facility: CLINIC | Age: 69
End: 2024-07-03
Payer: MEDICARE

## 2024-07-03 VITALS
HEIGHT: 75 IN | OXYGEN SATURATION: 97 % | WEIGHT: 273 LBS | SYSTOLIC BLOOD PRESSURE: 126 MMHG | DIASTOLIC BLOOD PRESSURE: 70 MMHG | HEART RATE: 86 BPM | BODY MASS INDEX: 33.94 KG/M2

## 2024-07-03 DIAGNOSIS — E11.65 TYPE 2 DIABETES MELLITUS WITH HYPERGLYCEMIA: ICD-10-CM

## 2024-07-03 DIAGNOSIS — E66.9 OBESITY, UNSPECIFIED: ICD-10-CM

## 2024-07-03 DIAGNOSIS — E78.5 HYPERLIPIDEMIA, UNSPECIFIED: ICD-10-CM

## 2024-07-03 PROCEDURE — 99214 OFFICE O/P EST MOD 30 MIN: CPT

## 2024-07-03 PROCEDURE — G2211 COMPLEX E/M VISIT ADD ON: CPT

## 2024-07-03 RX ORDER — REPAGLINIDE 0.5 MG/1
0.5 TABLET ORAL 3 TIMES DAILY
Qty: 90 | Refills: 2 | Status: ACTIVE | COMMUNITY
Start: 2024-07-03 | End: 1900-01-01

## 2024-07-05 LAB
ALBUMIN SERPL ELPH-MCNC: 4.4 G/DL
ALP BLD-CCNC: 80 U/L
ALT SERPL-CCNC: 20 U/L
ANION GAP SERPL CALC-SCNC: 14 MMOL/L
AST SERPL-CCNC: 15 U/L
BILIRUB SERPL-MCNC: 0.5 MG/DL
BUN SERPL-MCNC: 11 MG/DL
C PEPTIDE SERPL-MCNC: 6.3 NG/ML
CALCIUM SERPL-MCNC: 9.9 MG/DL
CHLORIDE SERPL-SCNC: 101 MMOL/L
CO2 SERPL-SCNC: 26 MMOL/L
CREAT SERPL-MCNC: 0.86 MG/DL
EGFR: 95 ML/MIN/1.73M2
GLUCOSE SERPL-MCNC: 213 MG/DL
POTASSIUM SERPL-SCNC: 5 MMOL/L
PROT SERPL-MCNC: 7.1 G/DL
SODIUM SERPL-SCNC: 141 MMOL/L

## 2024-07-08 ENCOUNTER — APPOINTMENT (OUTPATIENT)
Dept: OPHTHALMOLOGY | Facility: CLINIC | Age: 69
End: 2024-07-08

## 2024-07-08 ENCOUNTER — NON-APPOINTMENT (OUTPATIENT)
Age: 69
End: 2024-07-08

## 2024-07-08 LAB — PANC ISLET CELL AB SER QL: NORMAL

## 2024-07-09 ENCOUNTER — APPOINTMENT (OUTPATIENT)
Dept: ENDOCRINOLOGY | Facility: CLINIC | Age: 69
End: 2024-07-09

## 2024-07-10 ENCOUNTER — NON-APPOINTMENT (OUTPATIENT)
Age: 69
End: 2024-07-10

## 2024-07-10 LAB
GAD65 AB SER-MCNC: 0.15 NMOL/L
ISLET CELL512 AB SER-SCNC: 0 NMOL/L

## 2024-07-16 ENCOUNTER — NON-APPOINTMENT (OUTPATIENT)
Age: 69
End: 2024-07-16

## 2024-07-16 LAB — ZINC TRANSPORTER 8 AB: <15 U/ML

## 2024-07-24 ENCOUNTER — APPOINTMENT (OUTPATIENT)
Dept: OPHTHALMOLOGY | Facility: EYE CENTER | Age: 69
End: 2024-07-24

## 2024-07-25 ENCOUNTER — APPOINTMENT (OUTPATIENT)
Dept: OPHTHALMOLOGY | Facility: CLINIC | Age: 69
End: 2024-07-25

## 2024-07-26 RX ORDER — INSULIN LISPRO 100 [IU]/ML
100 INJECTION, SOLUTION INTRAVENOUS; SUBCUTANEOUS
Qty: 1 | Refills: 2 | Status: DISCONTINUED | COMMUNITY
Start: 2024-07-24 | End: 2024-07-26

## 2024-07-26 RX ORDER — INSULIN ASPART 100 [IU]/ML
100 INJECTION, SOLUTION INTRAVENOUS; SUBCUTANEOUS
Qty: 1 | Refills: 2 | Status: ACTIVE | COMMUNITY
Start: 2024-07-26

## 2024-07-31 ENCOUNTER — APPOINTMENT (OUTPATIENT)
Dept: PODIATRY | Facility: CLINIC | Age: 69
End: 2024-07-31

## 2024-08-05 ENCOUNTER — APPOINTMENT (OUTPATIENT)
Dept: OPHTHALMOLOGY | Facility: CLINIC | Age: 69
End: 2024-08-05

## 2024-08-05 ENCOUNTER — NON-APPOINTMENT (OUTPATIENT)
Age: 69
End: 2024-08-05

## 2024-08-05 PROCEDURE — 92012 INTRM OPH EXAM EST PATIENT: CPT

## 2024-08-14 ENCOUNTER — NON-APPOINTMENT (OUTPATIENT)
Age: 69
End: 2024-08-14

## 2024-08-22 ENCOUNTER — APPOINTMENT (OUTPATIENT)
Dept: PODIATRY | Facility: CLINIC | Age: 69
End: 2024-08-22

## 2024-08-22 DIAGNOSIS — M79.674 PAIN IN RIGHT TOE(S): ICD-10-CM

## 2024-08-22 DIAGNOSIS — Z86.59 PERSONAL HISTORY OF OTHER MENTAL AND BEHAVIORAL DISORDERS: ICD-10-CM

## 2024-08-22 DIAGNOSIS — M79.675 PAIN IN RIGHT TOE(S): ICD-10-CM

## 2024-08-22 DIAGNOSIS — B35.1 TINEA UNGUIUM: ICD-10-CM

## 2024-08-22 DIAGNOSIS — F41.9 ANXIETY DISORDER, UNSPECIFIED: ICD-10-CM

## 2024-08-22 DIAGNOSIS — E11.49 TYPE 2 DIABETES MELLITUS WITH OTHER DIABETIC NEUROLOGICAL COMPLICATION: ICD-10-CM

## 2024-08-22 PROCEDURE — 11720 DEBRIDE NAIL 1-5: CPT

## 2024-08-22 PROCEDURE — 99202 OFFICE O/P NEW SF 15 MIN: CPT | Mod: 25

## 2024-08-26 PROBLEM — E11.49 TYPE 2 DIABETES MELLITUS WITH OTHER NEUROLOGIC COMPLICATION: Status: ACTIVE | Noted: 2024-08-22

## 2024-08-27 PROBLEM — M79.674 PAIN IN TOES OF BOTH FEET: Status: ACTIVE | Noted: 2024-08-26

## 2024-08-27 PROBLEM — B35.1 ONYCHOMYCOSIS: Status: ACTIVE | Noted: 2024-08-26

## 2024-08-27 NOTE — PHYSICAL EXAM
[1+] : left foot dorsalis pedis 1+ [Delayed in the Right Toes] : capillary refills normal in right toes [Delayed in the Left Toes] : capillary refills normal in the left toes [de-identified] : He has peripheral neuropathy bilateral. [FreeTextEntry4] : absent vibratory  [FreeTextEntry8] : absent vibratory  [FreeTextEntry1] : [place]-Praveen monofilament testing absent at the hallux, 1st MPJ and heel bilateral.

## 2024-08-27 NOTE — PHYSICAL EXAM
[1+] : left foot dorsalis pedis 1+ [Delayed in the Right Toes] : capillary refills normal in right toes [Delayed in the Left Toes] : capillary refills normal in the left toes [de-identified] : He has peripheral neuropathy bilateral. [FreeTextEntry4] : absent vibratory  [FreeTextEntry8] : absent vibratory  [FreeTextEntry1] : [place]-Praveen monofilament testing absent at the hallux, 1st MPJ and heel bilateral.

## 2024-08-27 NOTE — HISTORY OF PRESENT ILLNESS
[FreeTextEntry1] : Patient presents today. He is a newly diagnosed diabetic. His A1c is down to 10.9 and trending better. His fasting sugar is 215. He has end-stage deformed mycotic nails that he cannot care for himself. He has severe neuropathy.

## 2024-08-27 NOTE — PHYSICAL EXAM
[1+] : left foot dorsalis pedis 1+ [Delayed in the Right Toes] : capillary refills normal in right toes [Delayed in the Left Toes] : capillary refills normal in the left toes [de-identified] : He has peripheral neuropathy bilateral. [FreeTextEntry8] : absent vibratory  [FreeTextEntry4] : absent vibratory  [FreeTextEntry1] : [place]-Praveen monofilament testing absent at the hallux, 1st MPJ and heel bilateral.

## 2024-08-27 NOTE — ASSESSMENT
[FreeTextEntry1] : Impression: Onychomycosis. Pain. Diabetic neuropathy.  Treatment: I aggressively debrided and debulked hallux mycotic nails without incident. I recommended he use moisturizer. I discussed diabetic foot care and diabetic neuropathy. I explained that he is a fall risk and should use a cane. He will follow-up in the office for evaluation as needed.

## 2024-08-27 NOTE — ASSESSMENT
[FreeTextEntry1] : Impression: Onychomycosis. Pain. Diabetic neuropathy.  Treatment: I aggressively debrided and debulked hallux mycotic nails without incident. I recommended he use moisturizer. I discussed diabetic foot care and diabetic neuropathy. I explained that he is a fall risk and should use a cane. He will follow-up in the office for evaluation as needed. 3

## 2024-08-28 ENCOUNTER — APPOINTMENT (OUTPATIENT)
Dept: OPHTHALMOLOGY | Facility: EYE CENTER | Age: 69
End: 2024-08-28

## 2024-08-29 ENCOUNTER — APPOINTMENT (OUTPATIENT)
Dept: OPHTHALMOLOGY | Facility: CLINIC | Age: 69
End: 2024-08-29

## 2024-10-18 ENCOUNTER — RESULT CHARGE (OUTPATIENT)
Age: 69
End: 2024-10-18

## 2024-10-18 ENCOUNTER — APPOINTMENT (OUTPATIENT)
Dept: ENDOCRINOLOGY | Facility: CLINIC | Age: 69
End: 2024-10-18
Payer: MEDICARE

## 2024-10-18 VITALS
DIASTOLIC BLOOD PRESSURE: 68 MMHG | BODY MASS INDEX: 37.5 KG/M2 | SYSTOLIC BLOOD PRESSURE: 132 MMHG | WEIGHT: 300 LBS | HEART RATE: 92 BPM | OXYGEN SATURATION: 98 %

## 2024-10-18 DIAGNOSIS — E11.65 TYPE 2 DIABETES MELLITUS WITH HYPERGLYCEMIA: ICD-10-CM

## 2024-10-18 DIAGNOSIS — E78.5 HYPERLIPIDEMIA, UNSPECIFIED: ICD-10-CM

## 2024-10-18 LAB — HBA1C MFR BLD HPLC: 9.5

## 2024-10-18 PROCEDURE — 99214 OFFICE O/P EST MOD 30 MIN: CPT

## 2024-10-18 PROCEDURE — G2211 COMPLEX E/M VISIT ADD ON: CPT

## 2024-10-18 PROCEDURE — 95251 CONT GLUC MNTR ANALYSIS I&R: CPT

## 2024-11-01 ENCOUNTER — APPOINTMENT (OUTPATIENT)
Dept: PSYCHIATRY | Facility: CLINIC | Age: 69
End: 2024-11-01
Payer: MEDICARE

## 2024-11-01 PROCEDURE — 99214 OFFICE O/P EST MOD 30 MIN: CPT

## 2024-11-01 RX ORDER — ESCITALOPRAM OXALATE 10 MG/1
10 TABLET ORAL
Qty: 90 | Refills: 0 | Status: ACTIVE | COMMUNITY
Start: 2024-11-01 | End: 1900-01-01

## 2024-11-04 ENCOUNTER — APPOINTMENT (OUTPATIENT)
Dept: OPHTHALMOLOGY | Facility: CLINIC | Age: 69
End: 2024-11-04

## 2024-11-14 ENCOUNTER — APPOINTMENT (OUTPATIENT)
Dept: ENDOCRINOLOGY | Facility: CLINIC | Age: 69
End: 2024-11-14

## 2025-01-10 ENCOUNTER — APPOINTMENT (OUTPATIENT)
Dept: PSYCHIATRY | Facility: CLINIC | Age: 70
End: 2025-01-10

## 2025-02-05 ENCOUNTER — APPOINTMENT (OUTPATIENT)
Dept: PSYCHIATRY | Facility: CLINIC | Age: 70
End: 2025-02-05
Payer: MEDICARE

## 2025-02-05 PROCEDURE — 99214 OFFICE O/P EST MOD 30 MIN: CPT

## 2025-02-14 ENCOUNTER — APPOINTMENT (OUTPATIENT)
Dept: ENDOCRINOLOGY | Facility: CLINIC | Age: 70
End: 2025-02-14

## 2025-02-19 ENCOUNTER — NON-APPOINTMENT (OUTPATIENT)
Age: 70
End: 2025-02-19

## 2025-02-20 RX ORDER — INSULIN DEGLUDEC 200 U/ML
200 INJECTION, SOLUTION SUBCUTANEOUS
Qty: 4 | Refills: 0 | Status: ACTIVE | COMMUNITY
Start: 2025-02-20 | End: 1900-01-01

## 2025-02-26 ENCOUNTER — APPOINTMENT (OUTPATIENT)
Dept: PODIATRY | Facility: CLINIC | Age: 70
End: 2025-02-26

## 2025-02-26 DIAGNOSIS — M79.674 PAIN IN RIGHT TOE(S): ICD-10-CM

## 2025-02-26 DIAGNOSIS — B35.1 TINEA UNGUIUM: ICD-10-CM

## 2025-02-26 DIAGNOSIS — E11.49 TYPE 2 DIABETES MELLITUS WITH OTHER DIABETIC NEUROLOGICAL COMPLICATION: ICD-10-CM

## 2025-02-26 PROCEDURE — 99212 OFFICE O/P EST SF 10 MIN: CPT | Mod: 25

## 2025-02-26 PROCEDURE — 11720 DEBRIDE NAIL 1-5: CPT

## 2025-02-28 PROBLEM — M79.674 PAIN OF TOE OF RIGHT FOOT: Status: ACTIVE | Noted: 2025-02-28

## 2025-03-04 ENCOUNTER — APPOINTMENT (OUTPATIENT)
Dept: ENDOCRINOLOGY | Facility: CLINIC | Age: 70
End: 2025-03-04

## 2025-05-07 ENCOUNTER — APPOINTMENT (OUTPATIENT)
Dept: PSYCHIATRY | Facility: CLINIC | Age: 70
End: 2025-05-07

## 2025-05-21 ENCOUNTER — APPOINTMENT (OUTPATIENT)
Dept: ENDOCRINOLOGY | Facility: CLINIC | Age: 70
End: 2025-05-21

## 2025-06-16 ENCOUNTER — APPOINTMENT (OUTPATIENT)
Dept: PSYCHIATRY | Facility: CLINIC | Age: 70
End: 2025-06-16

## 2025-06-20 ENCOUNTER — APPOINTMENT (OUTPATIENT)
Dept: PSYCHIATRY | Facility: CLINIC | Age: 70
End: 2025-06-20

## 2025-06-25 ENCOUNTER — APPOINTMENT (OUTPATIENT)
Dept: ENDOCRINOLOGY | Facility: CLINIC | Age: 70
End: 2025-06-25

## 2025-06-27 ENCOUNTER — APPOINTMENT (OUTPATIENT)
Dept: PSYCHIATRY | Facility: CLINIC | Age: 70
End: 2025-06-27
Payer: MEDICARE

## 2025-06-27 PROCEDURE — 99214 OFFICE O/P EST MOD 30 MIN: CPT

## 2025-07-07 ENCOUNTER — APPOINTMENT (OUTPATIENT)
Dept: ENDOCRINOLOGY | Facility: CLINIC | Age: 70
End: 2025-07-07

## 2025-07-08 RX ORDER — INSULIN DEGLUDEC 100 U/ML
100 INJECTION, SOLUTION SUBCUTANEOUS
Qty: 4 | Refills: 0 | Status: ACTIVE | COMMUNITY
Start: 2025-07-08 | End: 1900-01-01

## 2025-07-23 ENCOUNTER — APPOINTMENT (OUTPATIENT)
Dept: ENDOCRINOLOGY | Facility: CLINIC | Age: 70
End: 2025-07-23

## 2025-07-25 ENCOUNTER — APPOINTMENT (OUTPATIENT)
Dept: ENDOCRINOLOGY | Facility: CLINIC | Age: 70
End: 2025-07-25

## 2025-07-29 ENCOUNTER — APPOINTMENT (OUTPATIENT)
Dept: ENDOCRINOLOGY | Facility: CLINIC | Age: 70
End: 2025-07-29